# Patient Record
Sex: MALE | Race: WHITE | HISPANIC OR LATINO
[De-identification: names, ages, dates, MRNs, and addresses within clinical notes are randomized per-mention and may not be internally consistent; named-entity substitution may affect disease eponyms.]

---
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
## 2022-01-07 ENCOUNTER — APPOINTMENT (OUTPATIENT)
Dept: PEDIATRIC ENDOCRINOLOGY | Facility: CLINIC | Age: 22
End: 2022-01-07
Payer: MEDICAID

## 2022-01-07 ENCOUNTER — NON-APPOINTMENT (OUTPATIENT)
Age: 22
End: 2022-01-07

## 2022-01-07 VITALS
DIASTOLIC BLOOD PRESSURE: 85 MMHG | HEART RATE: 65 BPM | BODY MASS INDEX: 27.38 KG/M2 | WEIGHT: 195.55 LBS | SYSTOLIC BLOOD PRESSURE: 125 MMHG | HEIGHT: 71.02 IN

## 2022-01-07 LAB
CREAT SPEC-SCNC: 319 MG/DL
GAD65 AB SER-MCNC: 0 NMOL/L
IGA SER QL IEP: 227 MG/DL
INSULIN P FAST SERPL-ACNC: 11.3 UU/ML
ISLET CELL512 AB SER-SCNC: 0 NMOL/L
MICROALBUMIN 24H UR DL<=1MG/L-MCNC: 2.5 MG/DL
MICROALBUMIN/CREAT 24H UR-RTO: 8 MG/G
TTG IGA SER IA-ACNC: <1.2 U/ML
TTG IGA SER-ACNC: NEGATIVE
ZINC TRANSPORTER 8 AB: <15 U/ML

## 2022-01-07 PROCEDURE — 99211 OFF/OP EST MAY X REQ PHY/QHP: CPT

## 2022-01-07 NOTE — HISTORY OF PRESENT ILLNESS
[2] : blood sugar levels are tested 2 times per day [Arms] : arms [_____ times per night] : [unfilled] time(s) per night [_____ times per week] : mild symptoms occuring [unfilled] time(s) per week [_____ times per month] : severe symptoms occuring [unfilled] time(s) per month [Glucagon at Home] : has glucagon at home [Previous Hypoglycemic Seizure] : has no history of hypoglycemic seizure [FreeTextEntry2] : Scout is a 21y0m old male with diabetes in April 2020 (VINI, ZnT8, islet cell antibody negative), presenting for followup. He was last seen by Tammy Díaz RN on 4/3/21. A1c was 8.2% at the time. He currently manages his diabetes with insulin injections and continuous glucose monitoring- dexcom G6. \par \par Since his last visit, Scout developed covid last January and went to the ED 3 times. He has since received the covid vaccine. \par Scout states he has not received a dexcom since December 2020, and he has been checking his BG sporadically, around 2 times per day. He said he spoke to someone in [place] about it. He would like to go back on it as he found it very helpful. Scout also stated that the insulin calc leigh was removed from leigh store. Instead of using ratios manually, he guestimates his short acting insulin doses. He has symptomatic lows 1-2 times per week, sometimes at work. He treats with gatorade from the vending machine.\par \par Insulin regimen\par Basaglar 52u \par ICR 8\par ISF 18, target 130\par For high blood sugar he administers 14-15 units admelog. For large meals he will give >15, for regular sized meals he will give 11-15 units

## 2022-01-07 NOTE — PHYSICAL EXAM
[Healthy Appearing] : healthy appearing [Well Nourished] : well nourished [Interactive] : interactive [Overweight] : overweight [Normal Appearance] : normal appearance [Well formed] : well formed [Normally Set] : normally set [Normal S1 and S2] : normal S1 and S2 [Clear to Ausculation Bilaterally] : clear to auscultation bilaterally [Abdomen Soft] : soft [Abdomen Tenderness] : non-tender [] : no hepatosplenomegaly [Normal] : normal  [Acanthosis Nigricans___] : no acanthosis nigricans [Murmur] : no murmurs [de-identified] : no erythema, edema or tenderness of injection sites  [de-identified] : BELKIS EOMI b/l, optic disc sharp  [de-identified] : deferred [de-identified] : CN 2-12 grossly intact, normal gait, 2+ patellar reflexes bilaterally.

## 2022-01-07 NOTE — CONSULT LETTER
[Dear  ___] : Dear  [unfilled], [Consult Letter:] : I had the pleasure of evaluating your patient, [unfilled]. [Please see my note below.] : Please see my note below. [Consult Closing:] : Thank you very much for allowing me to participate in the care of this patient.  If you have any questions, please do not hesitate to contact me. [Sincerely,] : Sincerely, [FreeTextEntry3] : Shirlene Gonzalez MD\par [facility]\par Division of Pediatric Endocrinology

## 2022-01-07 NOTE — THERAPY
[Today's Date] : [unfilled] [___] : [unfilled] units of insulin pre-bedtime [Carbohydrate Ratio:                  1 unit for every ___ grams of carbohydrates] : Carbohydrate Ratio: 1 unit for every [unfilled] grams of carbohydrates [BG Target = ____] : BG Target = [unfilled] [Insulin Sensitivity Factor = ____] : Insulin Sensitivity Factor = [unfilled] [Other:___] : [unfilled] [FreeTextEntry5] : basaglar

## 2022-01-10 RX ORDER — BLOOD-GLUCOSE SENSOR
EACH MISCELLANEOUS
Qty: 3 | Refills: 3 | Status: ACTIVE | COMMUNITY
Start: 2020-10-21 | End: 1900-01-01

## 2022-01-10 RX ORDER — BLOOD-GLUCOSE TRANSMITTER
EACH MISCELLANEOUS
Qty: 1 | Refills: 3 | Status: ACTIVE | COMMUNITY
Start: 2020-10-21 | End: 1900-01-01

## 2022-01-20 PROBLEM — R19.8 CHANGE IN BOWEL FUNCTION: Status: ACTIVE | Noted: 2022-01-20

## 2022-01-20 NOTE — HISTORY OF PRESENT ILLNESS
[FreeTextEntry1] : 21 with chronic bloating and loose stool\par No h/o gluten intolerance. No anemia\par Previously tested for celiac\par No blood in stool. No melena. No NSAID use. No tenesmus or pain.

## 2022-01-21 DIAGNOSIS — Z01.812 ENCOUNTER FOR PREPROCEDURAL LABORATORY EXAMINATION: ICD-10-CM

## 2022-01-21 RX ORDER — POLYETHYLENE GLYCOL 3350 AND ELECTROLYTES WITH LEMON FLAVOR 236; 22.74; 6.74; 5.86; 2.97 G/4L; G/4L; G/4L; G/4L; G/4L
236 POWDER, FOR SOLUTION ORAL
Qty: 1 | Refills: 0 | Status: ACTIVE | COMMUNITY
Start: 2022-01-21 | End: 1900-01-01

## 2022-02-16 ENCOUNTER — APPOINTMENT (OUTPATIENT)
Dept: PEDIATRIC ENDOCRINOLOGY | Facility: CLINIC | Age: 22
End: 2022-02-16

## 2022-02-18 ENCOUNTER — APPOINTMENT (OUTPATIENT)
Dept: PEDIATRIC ENDOCRINOLOGY | Facility: CLINIC | Age: 22
End: 2022-02-18
Payer: MEDICAID

## 2022-02-18 VITALS
BODY MASS INDEX: 27.87 KG/M2 | WEIGHT: 201.28 LBS | HEART RATE: 120 BPM | DIASTOLIC BLOOD PRESSURE: 85 MMHG | HEIGHT: 71.38 IN | SYSTOLIC BLOOD PRESSURE: 139 MMHG

## 2022-02-18 DIAGNOSIS — E66.3 OVERWEIGHT: ICD-10-CM

## 2022-02-18 PROCEDURE — 83036 HEMOGLOBIN GLYCOSYLATED A1C: CPT | Mod: 59,QW

## 2022-02-18 PROCEDURE — 99215 OFFICE O/P EST HI 40 MIN: CPT

## 2022-02-20 LAB — HBA1C MFR BLD HPLC: 8.8

## 2022-02-21 PROBLEM — E66.3 OVERWEIGHT, PEDIATRIC: Status: ACTIVE | Noted: 2021-12-13

## 2022-02-21 NOTE — THERAPY
[Other Date: ______] : [unfilled] [Other:___] : [unfilled] [___] : [unfilled] units of insulin pre-bedtime [Carbohydrate Ratio:                  1 unit for every ___ grams of carbohydrates] : Carbohydrate Ratio: 1 unit for every [unfilled] grams of carbohydrates [BG Target = ____] : BG Target = [unfilled] [Insulin Sensitivity Factor = ____] : Insulin Sensitivity Factor = [unfilled] [Insulin on Board (IOB) Duration = ____ hours] : Insulin on Board (IOB) Duration  = [unfilled] hours [FreeTextEntry5] : basaglar
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
## 2022-02-28 ENCOUNTER — APPOINTMENT (OUTPATIENT)
Dept: GASTROENTEROLOGY | Facility: HOSPITAL | Age: 22
End: 2022-02-28

## 2022-03-07 NOTE — CONSULT LETTER
[Dear  ___] : Dear  [unfilled], [Consult Letter:] : I had the pleasure of evaluating your patient, [unfilled]. [Please see my note below.] : Please see my note below. [Consult Closing:] : Thank you very much for allowing me to participate in the care of this patient.  If you have any questions, please do not hesitate to contact me. [Sincerely,] : Sincerely, [FreeTextEntry3] : Shirlene Gonzalez MD\par [facility]\par Division of Pediatric Endocrinology  No

## 2022-03-09 ENCOUNTER — NON-APPOINTMENT (OUTPATIENT)
Age: 22
End: 2022-03-09

## 2022-04-14 ENCOUNTER — APPOINTMENT (OUTPATIENT)
Dept: PEDIATRIC MEDICAL GENETICS | Facility: CLINIC | Age: 22
End: 2022-04-14

## 2022-04-14 ENCOUNTER — APPOINTMENT (OUTPATIENT)
Dept: PEDIATRIC MEDICAL GENETICS | Facility: CLINIC | Age: 22
End: 2022-04-14
Payer: MEDICAID

## 2022-04-14 PROCEDURE — 99205 OFFICE O/P NEW HI 60 MIN: CPT

## 2022-04-14 PROCEDURE — ZZZZZ: CPT

## 2022-04-16 NOTE — FAMILY HISTORY
[FreeTextEntry1] : Scout's older sister, parents and grandparents all have type 2 diabetes.  His parents are from [place] and are nonconsanguineous.

## 2022-04-16 NOTE — PHYSICAL EXAM
[Normal shape and position] : normal shape and position [Normal] : no rash, no stigmata of neurocutaneous disease [Normal Nails] : normal nails [Normal Hair] : normal hair [Cranial Nerves] : cranial nerves are normal [Muscle tone/ strength] : muscle tone/ strength is normal [de-identified] : long fingers and toes [de-identified] : several fillings for cavities

## 2022-04-16 NOTE — HISTORY OF PRESENT ILLNESS
[FreeTextEntry1] : Scout is a 21 year old male with type 2 diabetes.  He was diagnosed in April 2020 after being hospitalized in the PICU for hyperglycemia.  Prior to his hospitalization, he had a 2 week period of fever, vomiting, extreme thirst and anxiety.   He was seen in the ER and then admitted.  Scout was started on Insulin and he has been followed in Peds Endo.  A Hem A1C in Dec 2021 was 9.9%.  In Feb 2022 it was at 8.8%.  Scout was referred to Medical Genetic to rule out BONITA.  \par \par Scout had  lesion removed from his right lower lip (cyst vs lymph malformation).  A biopsy reveled a mucocele.  He also had a neck mass removed in 2011 (not biopsied but was thought to be a cyst).     Scout had myringotomy tubes at age 6.  He has asthma and uses an inhaler as needed.

## 2022-04-16 NOTE — REASON FOR VISIT
[Initial - Scheduled] : [unfilled]  is being seen for  ~M an initial scheduled visit [FreeTextEntry3] : He is being seen to R/O BONITA.\par \par Patient was seen with genetic counselor, Yessica Berry\par

## 2022-05-11 ENCOUNTER — APPOINTMENT (OUTPATIENT)
Dept: PEDIATRIC ENDOCRINOLOGY | Facility: CLINIC | Age: 22
End: 2022-05-11

## 2022-11-10 RX ORDER — INSULIN GLARGINE 100 [IU]/ML
100 INJECTION, SOLUTION SUBCUTANEOUS
Qty: 2 | Refills: 6 | Status: ACTIVE | COMMUNITY
Start: 2020-04-26 | End: 1900-01-01

## 2022-12-07 ENCOUNTER — APPOINTMENT (OUTPATIENT)
Dept: PEDIATRIC ENDOCRINOLOGY | Facility: CLINIC | Age: 22
End: 2022-12-07

## 2022-12-07 VITALS
WEIGHT: 203.49 LBS | SYSTOLIC BLOOD PRESSURE: 130 MMHG | BODY MASS INDEX: 28.17 KG/M2 | HEIGHT: 71.1 IN | DIASTOLIC BLOOD PRESSURE: 86 MMHG | HEART RATE: 99 BPM

## 2022-12-07 DIAGNOSIS — E78.00 PURE HYPERCHOLESTEROLEMIA, UNSPECIFIED: ICD-10-CM

## 2022-12-07 DIAGNOSIS — E11.9 TYPE 2 DIABETES MELLITUS W/OUT COMPLICATIONS: ICD-10-CM

## 2022-12-07 PROCEDURE — 83036 HEMOGLOBIN GLYCOSYLATED A1C: CPT | Mod: 59,QW

## 2022-12-07 PROCEDURE — 99215 OFFICE O/P EST HI 40 MIN: CPT
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
## 2022-12-12 ENCOUNTER — NON-APPOINTMENT (OUTPATIENT)
Age: 22
End: 2022-12-12

## 2022-12-23 PROBLEM — E11.9 DIABETES MELLITUS: Status: ACTIVE | Noted: 2020-04-26

## 2022-12-23 PROBLEM — E78.00 ELEVATED LDL CHOLESTEROL LEVEL: Status: ACTIVE | Noted: 2021-12-13

## 2023-01-03 RX ORDER — INSULIN LISPRO 100 U/ML
100 INJECTION, SOLUTION SUBCUTANEOUS
Qty: 4 | Refills: 7 | Status: ACTIVE | COMMUNITY
Start: 2020-04-26 | End: 1900-01-01

## 2023-01-03 RX ORDER — PEN NEEDLE, DIABETIC 29 G X1/2"
32G X 4 MM NEEDLE, DISPOSABLE MISCELLANEOUS
Qty: 2 | Refills: 7 | Status: ACTIVE | COMMUNITY
Start: 2020-04-26 | End: 1900-01-01

## 2023-03-11 LAB
CHOLEST SERPL-MCNC: 191 MG/DL
CREAT SPEC-SCNC: 202 MG/DL
HBA1C MFR BLD HPLC: 9.9
HDLC SERPL-MCNC: 45 MG/DL
LDLC SERPL CALC-MCNC: 113 MG/DL
MICROALBUMIN 24H UR DL<=1MG/L-MCNC: 1.3 MG/DL
MICROALBUMIN/CREAT 24H UR-RTO: 6 MG/G
NONHDLC SERPL-MCNC: 146 MG/DL
T4 FREE SERPL-MCNC: 1.4 NG/DL
T4 SERPL-MCNC: 9.4 UG/DL
TRIGL SERPL-MCNC: 167 MG/DL
TSH SERPL-ACNC: 0.95 UIU/ML
TTG IGA SER IA-ACNC: <1.2 U/ML
TTG IGA SER-ACNC: NEGATIVE

## 2023-09-20 ENCOUNTER — NON-APPOINTMENT (OUTPATIENT)
Age: 23
End: 2023-09-20

## 2023-10-31 NOTE — PHYSICAL EXAM
[facility]ED[facility]  (Plasmapheresis, CPT: 80995U)      Date of Service: 10/31/2023  Requesting Physician: Vickie Rico MD  Disease Indication:  AIDP  Series Procedure Number:  A3      Patient ID: David Nieto is a 55 year old male admitted on 10/27/2023 with a primary diagnosis of acute inflammatory demyelinating polyneuropathy (AIDP).    Interim History:  Since last apheresis, yesterday, Mr. Nieto has no change in his symptoms. He has numbness and tingling in his lower extremities up to his keens and upper extremities up to his elbows. He has not been able to get out of bed as of yet so he doesn't know if his walking is any better.  He denies any headaches, shortness of breath or chest pain. He had no questions about the procedure today and tolerated it well.     Current Hospital Medications:    Current Facility-Administered Medications   Medication   • ondansetron (ZOFRAN ODT) disintegrating tablet 4 mg   • sulfamethoxazole-trimethoprim (BACTRIM SS) 400-80 MG tablet 2 tablet   • albumin human injection 175 g   • albumin human injection 12.5 g   • calcium gluconate 4,000 mg in sodium chloride 0.9 % 250 mL IVPB   • acetaminophen (TYLENOL) tablet 650 mg   • acyclovir (ZOVIRAX) tablet 400 mg   • escitalopram (LEXAPRO) tablet 10 mg   • famotidine (PEPCID) tablet 20 mg   • latanoprost (XALATAN) 0.005 % ophthalmic solution 1 drop   • loperamide (IMODIUM) capsule 2 mg   • oxyCODONE (IMM REL) (ROXICODONE) tablet 5 mg   • pregabalin (LYRICA) capsule 150 mg   • rosuvastatin (CRESTOR) tablet 5 mg   • senna (SENOKOT) 17.2 mg   • tamsulosin (FLOMAX) capsule 0.8 mg   • thiamine (VITAMIN B1) tablet 100 mg   • sodium chloride 0.9 % flush bag 25 mL   • sodium chloride 0.9 % injection 2 mL   • sodium chloride 0.9 % flush bag 25 mL   • sodium chloride (NORMAL SALINE) 0.9 % bolus 500 mL   • aluminum-magnesium hydroxide-simethicone (MAALOX) 200-200-20 MG/5ML suspension 30 mL   •  trimethobenzamide (TIGAN) injection 200 mg   • Magnesium Standard Replacement Protocol   • Potassium Standard Replacement Protocol (Levels 3.5 and lower)       Exam:  Vitals:    10/30/23 1333 10/30/23 1416 10/30/23 2026 10/31/23 0823   BP: 128/70 118/73  113/62   BP Location:  LUE - Left upper extremity  LUE - Left upper extremity   Patient Position:  Supine  Sitting   Pulse: 76 79  73   Resp: 18 18  18   Temp: 98.5 °F (36.9 °C) 97.5 °F (36.4 °C)  98.2 °F (36.8 °C)   TempSrc: Oral Oral  Oral   SpO2: 95% 97%  98%   Weight:    94.1 kg (207 lb 7.3 oz)   Height:   5' 5\" (1.651 m)        Mr. Nieto is resting well in his bed, in no acute distress.     Labs:    Lab Results   Component Value Date/Time    HGB 12.6 (L) 10/27/2023 07:18 PM    HGB 12.5 (L) 12/12/2019 11:20 AM    HCT 38.8 (L) 10/27/2023 07:18 PM    HCT 35.7 (L) 12/12/2019 11:20 AM    WBC 4.5 10/27/2023 07:18 PM    WBC 5.8 12/12/2019 11:20 AM     10/27/2023 07:18 PM     (L) 12/12/2019 11:20 AM     04/13/2013 06:58 AM       After reviewing all relevant clinical and laboratory data, I believe it is both safe and medically necessary to perform today's plasmapheresis.    Procedure:    A 1 plasma volume exchange transfusion was completed. I saw Mr. Nieto during the procedure and I was immediately available during the entire course of the procedure.    Type of fluid used for exchange: 5% albumin    Calcium replacement was achieved with: calcium gluconate (10%), 4 grams    The patient required the following medications during the procedure: No PRN medications needed.     Complications: no complications    Plan: Today's procedure was the third procedure of 5 total planned procedures. Next procedure is due on 11/2/2023 and 11/3/2023.     Mckenna Waite MD  Transfusion Medicine Fellow    [Healthy Appearing] : healthy appearing [Interactive] : interactive [Well Nourished] : well nourished [Overweight] : overweight [Acanthosis Nigricans___] : no acanthosis nigricans [Normal Appearance] : normal appearance [Well formed] : well formed [Normally Set] : normally set [Normal S1 and S2] : normal S1 and S2 [Murmur] : no murmurs [Clear to Ausculation Bilaterally] : clear to auscultation bilaterally [Abdomen Soft] : soft [Abdomen Tenderness] : non-tender [] : no hepatosplenomegaly [Normal] : normal  [de-identified] : no erythema, edema or tenderness of injection sites . no lipoatrophy or lipohypertrophy [de-identified] : BELKIS EOMI b/l, optic disc sharp  [de-identified] : deferred [de-identified] : CN 2-12 grossly intact, normal gait, 2+ patellar reflexes bilaterally.

## 2023-11-22 ENCOUNTER — EMERGENCY (EMERGENCY)
Facility: HOSPITAL | Age: 23
LOS: 1 days | Discharge: HOME | End: 2023-11-22
Attending: STUDENT IN AN ORGANIZED HEALTH CARE EDUCATION/TRAINING PROGRAM
Payer: COMMERCIAL

## 2023-11-22 VITALS
SYSTOLIC BLOOD PRESSURE: 132 MMHG | HEART RATE: 96 BPM | RESPIRATION RATE: 18 BRPM | OXYGEN SATURATION: 96 % | DIASTOLIC BLOOD PRESSURE: 90 MMHG | TEMPERATURE: 99 F

## 2023-11-22 VITALS
RESPIRATION RATE: 18 BRPM | TEMPERATURE: 98 F | HEART RATE: 130 BPM | OXYGEN SATURATION: 95 % | DIASTOLIC BLOOD PRESSURE: 84 MMHG | SYSTOLIC BLOOD PRESSURE: 159 MMHG

## 2023-11-22 PROCEDURE — 93010 ELECTROCARDIOGRAM REPORT: CPT

## 2023-11-22 PROCEDURE — 99285 EMERGENCY DEPT VISIT HI MDM: CPT

## 2023-11-22 PROCEDURE — 71046 X-RAY EXAM CHEST 2 VIEWS: CPT | Mod: 26

## 2023-11-22 RX ORDER — ACETAMINOPHEN 500 MG
975 TABLET ORAL ONCE
Refills: 0 | Status: COMPLETED | OUTPATIENT
Start: 2023-11-22 | End: 2023-11-22

## 2023-11-22 RX ORDER — ONDANSETRON 8 MG/1
4 TABLET, FILM COATED ORAL ONCE
Refills: 0 | Status: COMPLETED | OUTPATIENT
Start: 2023-11-22 | End: 2023-11-22

## 2023-11-22 RX ADMIN — ONDANSETRON 4 MILLIGRAM(S): 8 TABLET, FILM COATED ORAL at 21:34

## 2023-11-22 RX ADMIN — Medication 975 MILLIGRAM(S): at 21:34

## 2023-11-22 NOTE — ED PROVIDER NOTE - NSFOLLOWUPINSTRUCTIONS_ED_ALL_ED_FT
Nasal Fracture    WHAT YOU NEED TO KNOW:    What is a nasal fracture? A nasal fracture is a crack or break in your nose. You may have a break in the upper nose (bridge), the side, or the septum. The septum is in the middle of the nose and divides your nostrils.    What are the signs and symptoms of a nasal fracture?    Pain and swelling    Nosebleed    Deformed nose    Crackling sound when you touch or move your nose    Bruising on your nose or under your eyes  How is a nasal fracture diagnosed? Your healthcare provider will ask you when, where, and how the injury occurred. You may need any of the following:    A nasal exam will be done to check your injury. You will be given pain medicine before your healthcare provider touches and looks at the outside and inside of your nose. Your provider will remove blood clots and check for hematomas (collections of blood).    An x-ray or CT may show the nasal fracture. You may be given contrast liquid before the scan. Tell the healthcare provider if you have ever had an allergic reaction to contrast liquid.  How is a nasal fracture treated?    Medicine may be given to decrease pain or help prevent a bacterial infection. Ask how to take pain medicine safely. Medicine may also be given to decrease nasal swelling and help make breathing easier.    Wound care may help stop bleeding. If you have a hematoma inside your nose, it will be drained. Healthcare providers may place packing (gauze or other material) inside your nose to soak up blood.    Closed reduction may be done to put your nasal bones back into the correct position. Local or general anesthesia is used during this procedure. This procedure may be done right away or several days after your injury when the swelling has gone down. Surgery (open reduction) to put your bones back into place may be needed for severe fractures.    Splints or packing help keep your nose in place for 7 to 10 days after a reduction. Ask your healthcare provider how to care for your wounds, splint, or packing.  How do I care for my nasal fracture at home?    Apply ice on your nose for 15 to 20 minutes every hour or as directed. Use an ice pack, or put crushed ice in a plastic bag. Cover it with a towel. Ice helps prevent tissue damage and decreases swelling and pain.    Elevate your head when you lie down. This will help decrease swelling and pain. You may need to see a specialist 3 to 5 days later for tests or more treatment after swelling has gone down.    Protect your nose to prevent bleeding, bruising, or another fracture. Try not to bump your nose on anything. You may not be able to play sports for up to 6 weeks.  When should I seek immediate care?    You feel like one or both of your nasal passages are blocked and you have trouble breathing.    Clear fluid is leaking from your nose.    You have severe nose pain, even after you take medicine.    You have double vision or have problems moving your eyes.  When should I call my doctor?    You have a fever.    You continue to have nosebleeds.    You have a headache that gets worse, even after you take pain medicine.    Your splint or packing is loose.    You have questions or concerns about your condition or care.    ======================    Concussion, Adult  Three rear views of the head showing how quick, sudden head movements injure the brain.  A concussion is a brain injury from a hard, direct hit (trauma) to the head or body. This direct hit causes the brain to shake quickly back and forth inside the skull. This can damage brain cells and cause chemical changes in the brain. A concussion may also be known as a mild traumatic brain injury (TBI).    The effects of a concussion can be serious. If you have a concussion, you should be very careful to avoid having a second concussion.    What are the causes?  This condition is caused by:  A direct hit to your head.  Sudden movement of your body that causes your brain to move back and forth inside the skull, such as in a car crash.  What are the signs or symptoms?  The signs of a concussion can be hard to notice. Early on, they may be missed by you, family members, and health care providers. You may look fine on the outside but may act or feel differently.    Every head injury is different. Symptoms are usually temporary but may last for days, weeks, or even months. Some symptoms appear right away, but other symptoms may not show up for hours or days.    Physical symptoms    Headaches.  Dizziness and problems with coordination or balance.  Sensitivity to light or noise.  Nausea or vomiting.  Tiredness (fatigue).  Vision or hearing problems.  Seizure.  Mental and emotional symptoms    Irritability or mood changes.  Memory problems.  Trouble concentrating, organizing, or making decisions.  Changes in eating or sleeping patterns.  Slowness in thinking, acting or reacting, speaking, or reading.  Anxiety or depression.  How is this diagnosed?  This condition is diagnosed based on your symptoms and injury.    You may also have tests, including:  Imaging tests, such as a CT scan or an MRI.  Neuropsychological tests. These measure your thinking, understanding, learning, and memory.  How is this treated?  Treatment for this condition includes:  Stopping sports or activity if you are injured.  Physical and mental rest and careful observation, usually at home.  Medicines to help with symptoms such as headaches, nausea, or difficulty sleeping.  Referral to a concussion clinic or rehab center.  Follow these instructions at home:  Activity    Limit activities that require a lot of thought or concentration, such as:  Doing homework or job-related work.  Watching TV.  Using the computer or phone.  Playing memory games and doing puzzles.  Rest helps your brain heal. Make sure you:  Get plenty of sleep. Most adults should get 7–9 hours of sleep each night.  Rest during the day. Take naps or rest breaks when you feel tired.  Avoid high-intensity exercise or physical activities that take a lot of effort. Stop any activity that worsens symptoms. Your health care provider may recommend light exercise such as walking.  Do not do high-risk activities that could cause a second concussion, such as riding a bike or playing sports.  Ask your health care provider when you can return to your normal activities, such as school, work, sports, and driving. Your ability to react may be slower after a brain injury. Never do these activities if you are dizzy.  General instructions    A bottle of beer, a glass of wine, and a glass of hard liquor with a "do not drink" sign over them.   Take over-the-counter and prescription medicines only as told by your health care provider. Some medicines, such as blood thinners (anticoagulants) and aspirin, may increase the risk for complications, such as bleeding.  Avoid taking opioid pain medicine while recovering from a concussion.  Do not drink alcohol until your health care provider says you can. Drinking alcohol may slow your recovery and can put you at risk of further injury.  Watch your symptoms and tell others around you to do the same. Complications sometimes occur after a concussion.  Tell your , teachers, school nurse, school counselor, , or  about your injury, symptoms, and restrictions.  See a mental health therapist if you feel anxious or depressed. Managing this condition can be challenging.  Keep all follow-up visits. Your health care provider will check on your recovery and give you a plan for returning to activities.  How is this prevented?  Avoiding another brain injury is very important. In rare cases, another injury can lead to permanent brain damage, brain swelling, or death. The risk of this is greatest during the first 7–10 days after a head injury. Avoid injuries by:  Stopping activities that could lead to a second concussion, such as contact or recreational sports, until your health care provider says it is okay.  Taking these actions once you have returned to sports or activities:  Avoid plays or moves that can cause you to crash into another person. This is how most concussions occur.  Follow the rules and be respectful of other players. Do not engage in violent or illegal plays.  Getting regular exercise that includes strength and balance training.  Wearing a properly fitting helmet during sports, biking, or other activities. Helmets can help protect you from serious skull and brain injuries, but they may not protect you from a concussion. Even when wearing a helmet, you should avoid being hit in the head.  Where to find more information  Centers for Disease Control and Prevention: cdc.gov  Contact a health care provider if:  Your symptoms do not improve or get worse.  You have new symptoms.  You have another injury.  Your coordination gets worse.  You have unusual behavior changes.  Get help right away if:  You have a severe or worsening headache.  You have weakness or numbness in any part of your body, slurred speech, vision changes, or confusion.  You vomit repeatedly.  You lose consciousness, are sleepier than normal, or are difficult to wake up.  You have a seizure.  These symptoms may be an emergency. Get help right away. Call 911.  Do not wait to see if the symptoms will go away.  Do not drive yourself to the hospital.  Also, get help right away if:  You have thoughts of hurting yourself or others.  Take one of these steps if you feel like you may hurt yourself or others, or have thoughts about taking your own life:  Go to your nearest emergency room.  Call 911.  Call the National Suicide Prevention Lifeline at 1-182.478.3824 or 692. This is open 24 hours a day.  Text the Crisis Text Line at 942843.  This information is not intended to replace advice given to you by your health care provider. Make sure you discuss any questions you have with your health care provider.
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
## 2023-11-22 NOTE — ED ADULT NURSE NOTE - NSICDXPASTMEDICALHX_GEN_ALL_CORE_FT
PAST MEDICAL HISTORY:  Acute Non Suppurative Otitis Media     Febrile Seizure     S/P Myringotomy with Insertion of Tube     Type 1 diabetes mellitus

## 2023-11-22 NOTE — ED PROVIDER NOTE - CARE PROVIDER_API CALL
Cipriano Sheltno  Otolaryngology  [address] [place] [address], [address]  [place] [address]  Phone: [phone]  Fax: [phone]  Follow Up Time:

## 2023-11-22 NOTE — ED PROVIDER NOTE - PHYSICAL EXAMINATION
Constitutional: Awake, alert, in no acute distress  Eyes: PERRL  HENT: no scalp tenderness or deformity, +superficial abrasion overlying right nose, +dried blood in nares, no septal hematoma, airway patent  Neck: +diffuse cervical tenderness, no palpable stepoff, no tracheal deviation  CV: +diffuse anterior chest wall tenderness, no crepitus or subcutaneous emphysema.  RRR, no murmur  Pulm: non-labored respirations, CTAB  Abdomen: soft, non-tender, non-distended, no ecchymosis  Back: no spinal tenderness, no palpable stepoff  Extremities: stable pelvis, no extremity tenderness or deformity  Skin: no rash  Neuro: AAOx3, GCS 15, moving all extremities equally, no focal neurologic deficit

## 2023-11-22 NOTE — ED PROVIDER NOTE - PATIENT PORTAL LINK FT
You can access the FollowMyHealth Patient Portal offered by [facility] by registering at the following website: [email or web address]. By joining [employer]’s FollowMyHealth portal, you will also be able to view your health information using other applications (apps) compatible with our system.
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
## 2023-11-23 PROCEDURE — 71046 X-RAY EXAM CHEST 2 VIEWS: CPT

## 2023-11-23 PROCEDURE — 70486 CT MAXILLOFACIAL W/O DYE: CPT | Mod: ME

## 2023-11-23 PROCEDURE — 70450 CT HEAD/BRAIN W/O DYE: CPT | Mod: 26,MG

## 2023-11-23 PROCEDURE — 72125 CT NECK SPINE W/O DYE: CPT | Mod: 26,MG

## 2023-11-23 PROCEDURE — 72125 CT NECK SPINE W/O DYE: CPT | Mod: MG

## 2023-11-23 PROCEDURE — G1004: CPT

## 2023-11-23 PROCEDURE — 93005 ELECTROCARDIOGRAM TRACING: CPT

## 2023-11-23 PROCEDURE — 70486 CT MAXILLOFACIAL W/O DYE: CPT | Mod: 26,ME

## 2023-11-23 PROCEDURE — 70450 CT HEAD/BRAIN W/O DYE: CPT | Mod: MG

## 2023-11-23 PROCEDURE — 99284 EMERGENCY DEPT VISIT MOD MDM: CPT | Mod: 25

## 2023-11-23 RX ORDER — IBUPROFEN 200 MG
400 TABLET ORAL ONCE
Refills: 0 | Status: COMPLETED | OUTPATIENT
Start: 2023-11-23 | End: 2023-11-23

## 2023-11-23 RX ADMIN — Medication 400 MILLIGRAM(S): at 01:16

## 2024-09-26 ENCOUNTER — EMERGENCY (EMERGENCY)
Facility: HOSPITAL | Age: 24
LOS: 1 days | Discharge: HOME | End: 2024-09-26
Attending: EMERGENCY MEDICINE
Payer: COMMERCIAL

## 2024-09-26 VITALS
RESPIRATION RATE: 18 BRPM | TEMPERATURE: 97 F | OXYGEN SATURATION: 96 % | SYSTOLIC BLOOD PRESSURE: 124 MMHG | DIASTOLIC BLOOD PRESSURE: 82 MMHG | HEART RATE: 62 BPM

## 2024-09-26 VITALS
WEIGHT: 212.97 LBS | DIASTOLIC BLOOD PRESSURE: 76 MMHG | OXYGEN SATURATION: 99 % | HEIGHT: 72 IN | TEMPERATURE: 98 F | HEART RATE: 57 BPM | SYSTOLIC BLOOD PRESSURE: 122 MMHG | RESPIRATION RATE: 16 BRPM

## 2024-09-26 PROBLEM — E10.9 TYPE 1 DIABETES MELLITUS WITHOUT COMPLICATIONS: Chronic | Status: ACTIVE | Noted: 2023-11-22

## 2024-09-26 LAB
ACETONE SERPL-MCNC: NEGATIVE — SIGNIFICANT CHANGE UP
ALBUMIN SERPL ELPH-MCNC: 4.8 G/DL — SIGNIFICANT CHANGE UP (ref 3.3–5.2)
ALP SERPL-CCNC: 105 U/L — SIGNIFICANT CHANGE UP (ref 40–120)
ALT FLD-CCNC: 21 U/L — SIGNIFICANT CHANGE UP
ANION GAP SERPL CALC-SCNC: 12 MMOL/L — SIGNIFICANT CHANGE UP (ref 5–17)
AST SERPL-CCNC: 20 U/L — SIGNIFICANT CHANGE UP
BASOPHILS # BLD AUTO: 0.04 K/UL — SIGNIFICANT CHANGE UP (ref 0–0.2)
BASOPHILS NFR BLD AUTO: 0.4 % — SIGNIFICANT CHANGE UP (ref 0–2)
BILIRUB SERPL-MCNC: 1 MG/DL — SIGNIFICANT CHANGE UP (ref 0.4–2)
BUN SERPL-MCNC: 10.7 MG/DL — SIGNIFICANT CHANGE UP (ref 8–20)
CALCIUM SERPL-MCNC: 8.9 MG/DL — SIGNIFICANT CHANGE UP (ref 8.4–10.5)
CHLORIDE SERPL-SCNC: 103 MMOL/L — SIGNIFICANT CHANGE UP (ref 96–108)
CO2 SERPL-SCNC: 26 MMOL/L — SIGNIFICANT CHANGE UP (ref 22–29)
CREAT SERPL-MCNC: 0.69 MG/DL — SIGNIFICANT CHANGE UP (ref 0.5–1.3)
EGFR: 135 ML/MIN/1.73M2 — SIGNIFICANT CHANGE UP
EOSINOPHIL # BLD AUTO: 0.04 K/UL — SIGNIFICANT CHANGE UP (ref 0–0.5)
EOSINOPHIL NFR BLD AUTO: 0.4 % — SIGNIFICANT CHANGE UP (ref 0–6)
GAS PNL BLDV: SIGNIFICANT CHANGE UP
GLUCOSE SERPL-MCNC: 147 MG/DL — HIGH (ref 70–99)
HCT VFR BLD CALC: 46.9 % — SIGNIFICANT CHANGE UP (ref 39–50)
HGB BLD-MCNC: 16.2 G/DL — SIGNIFICANT CHANGE UP (ref 13–17)
IMM GRANULOCYTES NFR BLD AUTO: 0.3 % — SIGNIFICANT CHANGE UP (ref 0–0.9)
LIDOCAIN IGE QN: 17 U/L — LOW (ref 22–51)
LYMPHOCYTES # BLD AUTO: 1.24 K/UL — SIGNIFICANT CHANGE UP (ref 1–3.3)
LYMPHOCYTES # BLD AUTO: 12.3 % — LOW (ref 13–44)
MCHC RBC-ENTMCNC: 29.1 PG — SIGNIFICANT CHANGE UP (ref 27–34)
MCHC RBC-ENTMCNC: 34.5 GM/DL — SIGNIFICANT CHANGE UP (ref 32–36)
MCV RBC AUTO: 84.4 FL — SIGNIFICANT CHANGE UP (ref 80–100)
MONOCYTES # BLD AUTO: 0.47 K/UL — SIGNIFICANT CHANGE UP (ref 0–0.9)
MONOCYTES NFR BLD AUTO: 4.6 % — SIGNIFICANT CHANGE UP (ref 2–14)
NEUTROPHILS # BLD AUTO: 8.29 K/UL — HIGH (ref 1.8–7.4)
NEUTROPHILS NFR BLD AUTO: 82 % — HIGH (ref 43–77)
PLATELET # BLD AUTO: 314 K/UL — SIGNIFICANT CHANGE UP (ref 150–400)
POTASSIUM SERPL-MCNC: 4.1 MMOL/L — SIGNIFICANT CHANGE UP (ref 3.5–5.3)
POTASSIUM SERPL-SCNC: 4.1 MMOL/L — SIGNIFICANT CHANGE UP (ref 3.5–5.3)
PROT SERPL-MCNC: 7.7 G/DL — SIGNIFICANT CHANGE UP (ref 6.6–8.7)
RBC # BLD: 5.56 M/UL — SIGNIFICANT CHANGE UP (ref 4.2–5.8)
RBC # FLD: 12.3 % — SIGNIFICANT CHANGE UP (ref 10.3–14.5)
SODIUM SERPL-SCNC: 141 MMOL/L — SIGNIFICANT CHANGE UP (ref 135–145)
WBC # BLD: 10.11 K/UL — SIGNIFICANT CHANGE UP (ref 3.8–10.5)
WBC # FLD AUTO: 10.11 K/UL — SIGNIFICANT CHANGE UP (ref 3.8–10.5)

## 2024-09-26 PROCEDURE — 82947 ASSAY GLUCOSE BLOOD QUANT: CPT

## 2024-09-26 PROCEDURE — 80053 COMPREHEN METABOLIC PANEL: CPT

## 2024-09-26 PROCEDURE — 99284 EMERGENCY DEPT VISIT MOD MDM: CPT | Mod: 25

## 2024-09-26 PROCEDURE — 96374 THER/PROPH/DIAG INJ IV PUSH: CPT

## 2024-09-26 PROCEDURE — 99284 EMERGENCY DEPT VISIT MOD MDM: CPT

## 2024-09-26 PROCEDURE — 84295 ASSAY OF SERUM SODIUM: CPT

## 2024-09-26 PROCEDURE — 84132 ASSAY OF SERUM POTASSIUM: CPT

## 2024-09-26 PROCEDURE — 82435 ASSAY OF BLOOD CHLORIDE: CPT

## 2024-09-26 PROCEDURE — 85025 COMPLETE CBC W/AUTO DIFF WBC: CPT

## 2024-09-26 PROCEDURE — 82803 BLOOD GASES ANY COMBINATION: CPT

## 2024-09-26 PROCEDURE — 83690 ASSAY OF LIPASE: CPT

## 2024-09-26 PROCEDURE — 36415 COLL VENOUS BLD VENIPUNCTURE: CPT

## 2024-09-26 PROCEDURE — 82330 ASSAY OF CALCIUM: CPT

## 2024-09-26 PROCEDURE — 76705 ECHO EXAM OF ABDOMEN: CPT | Mod: 26

## 2024-09-26 PROCEDURE — 85014 HEMATOCRIT: CPT

## 2024-09-26 PROCEDURE — 82009 KETONE BODYS QUAL: CPT

## 2024-09-26 PROCEDURE — 83605 ASSAY OF LACTIC ACID: CPT

## 2024-09-26 PROCEDURE — 76705 ECHO EXAM OF ABDOMEN: CPT

## 2024-09-26 PROCEDURE — 85018 HEMOGLOBIN: CPT

## 2024-09-26 RX ORDER — ONDANSETRON 2 MG/ML
1 INJECTION, SOLUTION INTRAMUSCULAR; INTRAVENOUS
Qty: 12 | Refills: 0
Start: 2024-09-26 | End: 2024-09-28

## 2024-09-26 RX ORDER — ONDANSETRON 2 MG/ML
4 INJECTION, SOLUTION INTRAMUSCULAR; INTRAVENOUS ONCE
Refills: 0 | Status: COMPLETED | OUTPATIENT
Start: 2024-09-26 | End: 2024-09-26

## 2024-09-26 RX ADMIN — ONDANSETRON 4 MILLIGRAM(S): 2 INJECTION, SOLUTION INTRAMUSCULAR; INTRAVENOUS at 12:36

## 2024-09-26 RX ADMIN — Medication 1000 MILLILITER(S): at 12:35

## 2024-09-26 NOTE — ED PROVIDER NOTE - ADDITIONAL NOTES AND INSTRUCTIONS:
PT was evaluated At [facility] ED and was found to have a condition that warranted time of to rest and heal from WORK/SCHOOL.   Senthil Chaparro PA-C

## 2024-09-26 NOTE — ED ADULT TRIAGE NOTE - ARRIVAL FROM
Colon polyp  benign 2003  Depression    Diabetes mellitus  type 2  Elevated uric acid in blood    HTN (hypertension)    Hyperlipidemia    Hypothyroidism    Knee pain  right knee pain and deformity  since age 22  Obesity (BMI 30-39.9)    Osteoarthritis    Shoulder fracture, right  2006  Sleep apnea  uses CPAP machine ; had sleep studies in 2003 and 2011
Home

## 2024-09-26 NOTE — ED ADULT NURSE NOTE - OBJECTIVE STATEMENT
24 YO male sent to the ED from Urgent care with c/o RUQ abdominal pain associated with nausea and multiple episodes of non-bilious vomiting since 0500 this morning. Patient is a known type I diabetic on insulin. RUQ abdominal pain, intermittent, throbbing non-radiating. Patient stated with every episode of vomiting he feels diaphoretic and lightheaded. fasting blood sugar at Urgent care was 171. Patient unable to tolerate anything PO since morning. Denies taking insulin this morning. Denies diarrhea, chest pain, SOB, headache, vision changes, fever, chills or urinary symptoms. blood work sent, awaiting CT scan.

## 2024-09-26 NOTE — ED PROVIDER NOTE - OBJECTIVE STATEMENT
23-year-old male with past ministry of type 1 diabetes presents with vomiting.  Patient ports he had 1 day of multiple episodes of nonbloody, nonbilious emesis.  He reports he is vomiting urgent comes up.  He denies any abdominal pain however, and no fever, chills, diarrhea, dysuria.  Patient reports that his sugars been well-controlled, usually in the 90s to low 100s.  Patient went to urgent care, his fasting blood sugar was 170 he was advised to come here for further evaluation 23-year-old male with past medical hx of type 1 diabetes presents with vomiting.  Patient ports he had 1 day of multiple episodes of nonbloody, nonbilious emesis.  .  He denies any abdominal pain however, and no fever, chills, diarrhea, dysuria.  Patient reports that his sugars been well-controlled, usually in the 90s to low 100s.  Patient went to urgent care, his fasting blood sugar was 170 he was advised to come here for further evaluation

## 2024-09-26 NOTE — ED PROVIDER NOTE - ATTENDING APP SHARED VISIT CONTRIBUTION OF CARE
I, Wally Zayas, performed the initial face to face bedside interview with this patient regarding history of present illness, review of symptoms and relevant past medical, social and family history.  I completed an independent physical examination.  I was the initial provider who evaluated this patient. I have signed out the follow up of any pending tests (i.e. labs, radiological studies) to the ACP.  I have communicated the patient’s plan of care and disposition with the ACP.

## 2024-09-26 NOTE — ED PROVIDER NOTE - NSFOLLOWUPINSTRUCTIONS_ED_ALL_ED_FT
Patient education: Nausea and vomiting in adults (The Basics)  Written by the doctors and editors at [facility]  Please read the Disclaimer at the end of this page.    What are nausea and vomiting?    Nausea is the feeling that you might throw up. Vomiting is when you actually throw up. They can happen together. But you can feel nauseous without vomiting, and you can vomit without feeling nauseous first.    What causes nausea and vomiting?    The most common causes include:    ?Food poisoning – This can happen if you eat food that has gone bad. It is basically an infection in your stomach. Food poisoning often also causes diarrhea.    ?Other infections – Other kinds of infections that affect the stomach or intestines can also cause nausea and vomiting. These include infections caused by different viruses and bacteria.    ?Dizziness or motion sickness – These can happen if you're on a boat, in a car, or in something else that moves. It can also happen if there's something wrong inside your ears that affects your balance.    ?Medicines – Lots of different medicines can cause nausea or vomiting. Some examples are antidepressants, antibiotics, vitamins, birth control pills, and pain medicines. People getting chemotherapy for cancer treatment or those who have been under anesthesia also often have nausea or vomiting. Sometimes, people who use cannabis (marijuana) over a long time have repeated episodes of vomiting.    ?Pregnancy – Many pregnant people have nausea or vomiting. This is sometimes called "morning sickness," but it can happen at any time of day.    ?Gastroesophageal reflux disease ("GERD") – This is a condition that causes the juices that are in the stomach to leak back up into the esophagus, the tube that connects the throat to the stomach. It can sometimes cause nausea.    ?Problems with the stomach or intestines – In some people, the stomach or intestines do not move food along correctly. In others, the intestines can get blocked. Both of these can cause nausea or vomiting.    ?Migraine – This condition involves a headache and often includes other symptoms, such as nausea and vomiting.    ?Alcohol – Drinking too much alcohol can cause nausea and vomiting.    Uncommon, but serious, causes of nausea and vomiting include:    ?Heart attack – This can sometimes cause nausea and/or vomiting. People who are having a heart attack usually also have chest pain.    ?Stroke – Certain types can cause vomiting. People who are having a stroke usually also have other symptoms, such as dizziness, balance problems, vision changes, or trouble speaking.    What can I do on my own to feel better?    You can:    ?Drink fluids when you can. It might help to take small sips every 15 to 30 minutes. Try to drink more as you start to feel better.    When you are vomiting a lot, your body loses both water and salt. Drinking fluids that contain salt can help replace what your body lost. Examples include "oral rehydration solutions," sports drinks, and broth.    ?Try eating, but start with foods with a lot of fluid in them. Good examples are soup, gelatin, and ice pops. If this goes well, you can try soft, bland foods. Foods that are high in carbohydrates ("carbs"), like bread or saltine crackers, can help settle your stomach. Ale can also help with nausea.    Avoid foods with a lot of fat in them. They can make nausea worse. Call your doctor if your symptoms come back when you try to eat.    ?Avoid strong smells, such as perfume.    ?If you need to take medicines, take them with meals if possible. But check the bottle first, because some medicines must be taken on an empty stomach.    How are nausea and vomiting treated?    If you have been vomiting a lot for more than a day, you might need other treatment or tests. Your doctor or nurse will ask you questions to try to find out what might be causing your symptoms. They might also:    ?Give you fluids through a thin tube that goes in a vein, called an "IV"    ?Give you medicines to help control nausea and vomiting. Examples include:    •Prochlorperazine (brand name: Compro)    •Promethazine (brand name: Phenergan)    •Metoclopramide (brand name: Reglan)    •Ondansetron (brand name: Zofran)    ?Schedule tests for you to help find out why you have nausea or vomiting, such as:    •Stomach X-ray    •Upper endoscopy – A doctor puts a thin tube down your throat and into your stomach. The tube has a light and a tiny camera on the end, so the doctor can see inside your stomach.    Can nausea and vomiting be prevented?    It depends on the cause. There are things you can do to lower the risk of getting an infection that causes nausea and vomiting:    ?Wash your hands often with soap and water (figure 1). This is especially important if you have been around someone who is sick. It's also important to wash your hands after using the bathroom or changing diapers.    ?Pay attention to food safety. This includes avoiding unpasteurized milk, washing fruits and vegetables before eating them, not eating undercooked food, and washing all cooking utensils. Wash your hands thoroughly after touching raw food.    If you often get nausea and vomiting for another reason, like motion sickness or migraine, talk to your doctor or nurse. They might have ways to help.    When should I call the doctor?    Call your doctor or nurse if you:    ?Have symptoms that last longer than a day or 2, or are severe    ?Have chest or belly pain    ?Vomit blood or something that looks like coffee grounds    ?Have a bowel movement with blood, or a bowel movement that is black and looks like tar    ?Have a fever higher than 101°F (38.4°C)    ?Have a severe headache or stiff neck    ?Feel very tired or have trouble getting up    ?Show signs of dehydration (which is when your body has lost too much water), such as:    •Feeling very tired    •Being very thirsty, or having a dry mouth or tongue    •Muscle cramps    •Dizziness    •Confusion    •Having dark yellow urine, or not needing to urinate for more than 5 hours

## 2024-09-26 NOTE — ED ADULT TRIAGE NOTE - TEMP AT ED ARRIVAL (C)
72 year old woman with HTN, DM2, past surgical history of  x 2, hysterectomy, and laparoscopic cholecystectomy (), admitted for large bowel obstruction; had spontaneous return of bowel function initially; however, bowel obstruction recurred, so taken to OR for ex lap with lysis of adhesions and bowel resection () . Monitored in SICU post -op ; now on regional. Hospital course complicated by up-trending leukocytosis of unknown etiology.     # Large bowel obstruction   #post operative state  s/p OR for ex lap with lysis of adhesions and bowel resection ()  - rest of care per primary team     # Leukocytosis -wbc down trending- to normal 9k today   unclear etiology -- Had CT imaging done that showed : Small fluid collections in the pelvis are only partially walled off. They could represent small hematomas or early developing abscesses  Was started on meropenem by ID   ESR/CRP eleavted   midline - needs to be removed at discharge as abx are being discontinued on d/c      #reactive thrombocytosis  plts elevated from 672 --> 719k ->615-> 633  will continue to monitor     #hypophosphatemia   phos 2.4  recommend replacement     #HTN   takes HCTZ 25mg qd, losartan 100mg qd, labetalol 200mg BID, nifedipine 60mg ER qd at home.   - continue labetalol 200mg BID  - on losartan at 50mg qd   - on nifepidine 60m ER qd ( can hold if bp is low )   - Continue to hold HCTZ on discharge as pt's BP is well controlled on above 3 anti-htn medication     # Type 2 Diabetes complicated by hyperglycemia   Takes metformin at home.   cw insulin sliding scale while inpatient.   resume metformin on discharge.     #Incidental finding of bilateral lung nodules  CT chest showing bilateral lung nodules, measuring up to 7 mm in the right   upper lobe  Recommend follow-up chest CT in three months to ensure stability.    # COVID 19 infection (present on admission)   Incidental finding of COVID 19 infection on admission nasal swab. Without any dyspnea, or sore throat. Per [facility] treatment algorithm, did not meet criteria for monoclonal antibodies at time of admission.   No O2 requirement, does not meet criteria for remdesivir/dexamethasone.   Recommend enoxaparin for DVT ppx     # Acute blood loss anemia   Hgb downtrended from time of admission to-->8-> 7.5  Partially attributable to operative blood losses and Right sided extraperitoneal hematoma  continue to trend daily CBC while inpatient;  ferritin, iron sat not low  age appropriate cancer screening outpatient     DVT ppx - HSQ 72 year old woman with HTN, DM2, past surgical history of  x 2, hysterectomy, and laparoscopic cholecystectomy (), admitted for large bowel obstruction; had spontaneous return of bowel function initially; however, bowel obstruction recurred, so taken to OR for ex lap with lysis of adhesions and bowel resection () . Monitored in SICU post -op ; now on regional. Hospital course complicated by up-trending leukocytosis of unknown etiology.     # Large bowel obstruction   #post operative state  s/p OR for ex lap with lysis of adhesions and bowel resection ()  - rest of care per primary team     # Leukocytosis -wbc down trending- to normal 9k today   unclear etiology -- Had CT imaging done that showed : Small fluid collections in the pelvis are only partially walled off. They could represent small hematomas or early developing abscesses  Was started on meropenem by ID   ESR/CRP eleavted   midline - needs to be removed at discharge as abx are being discontinued on d/c      #reactive thrombocytosis  plts elevated from 672 --> 719k ->615-> 633  will continue to monitor     #hypophosphatemia   phos 2.4  recommend replacement     #HTN   takes HCTZ 25mg qd, losartan 100mg qd, labetalol 200mg BID, nifedipine 60mg ER qd at home.   - continue labetalol 200mg BID  - on losartan at 50mg qd   - on nifepidine 60m ER qd ( can hold if bp is low )   - Continue to hold HCTZ on discharge as pt's BP is well controlled on above 3 anti-htn medication     # Type 2 Diabetes complicated by hyperglycemia   Takes metformin at home.   cw insulin sliding scale while inpatient.   pt's a1c at goal for her age, resume metformin on discharge.     #Incidental finding of bilateral lung nodules  CT chest showing bilateral lung nodules, measuring up to 7 mm in the right   upper lobe  Recommend follow-up chest CT in three months to ensure stability.    # COVID 19 infection (present on admission)   Incidental finding of COVID 19 infection on admission nasal swab. Without any dyspnea, or sore throat. Per [facility] treatment algorithm, did not meet criteria for monoclonal antibodies at time of admission.   No O2 requirement, does not meet criteria for remdesivir/dexamethasone.   Recommend enoxaparin for DVT ppx     # Acute blood loss anemia   Hgb downtrended from time of admission to-->8-> 7.5  Partially attributable to operative blood losses and Right sided extraperitoneal hematoma  continue to trend daily CBC while inpatient;  ferritin, iron sat not low  age appropriate cancer screening outpatient     DVT ppx - HSQ 36.8

## 2024-09-26 NOTE — ED PROVIDER NOTE - CROS ED CARDIOVAS ALL NEG
Chronic and ongoing. She states that she has been trying to work on her diet and is wanting to get out and exercise more.   negative...

## 2024-09-26 NOTE — ED PROVIDER NOTE - PATIENT PORTAL LINK FT
You can access the FollowMyHealth Patient Portal offered by [facility] by registering at the following website: [email or web address]. By joining [facility]’s FollowMyHealth portal, you will also be able to view your health information using other applications (apps) compatible with our system.

## 2024-09-26 NOTE — ED ADULT NURSE NOTE - SUICIDE SCREENING QUESTION 3
Patient is a 89y old  Male who presents with a chief complaint of PE, PNA? (14 Feb 2022 08:40)      INTERVAL HPI/OVERNIGHT EVENTS: Pt having severe congestion yesterday late afternoon/evening---today looks a bit better, alert on venti mask satting 100%  WBC-increased significantly to 14K  Lactic acid -increased again to 4.8    MEDICATIONS  (STANDING):  albumin human 25% IVPB 100 milliLiter(s) IV Intermittent every 6 hours  atorvastatin 20 milliGRAM(s) Oral at bedtime  bicalutamide 50 milliGRAM(s) Oral daily  cefepime   IVPB      enoxaparin Injectable 50 milliGRAM(s) SubCutaneous daily  pantoprazole    Tablet 40 milliGRAM(s) Oral before breakfast  scopolamine 1 mG/72 Hr(s) Patch 1 Patch Transdermal every 72 hours  vancomycin  IVPB        MEDICATIONS  (PRN):            Allergies    No Known Allergies    Intolerances        REVIEW OF SYSTEMS:  unable to obtain fully due to mentation but denies pain, states that is hungry     ICU Vital Signs Last 24 Hrs  T(C): 36.4 (19 Feb 2022 11:05), Max: 36.6 (18 Feb 2022 23:53)  T(F): 97.5 (19 Feb 2022 11:05), Max: 97.9 (18 Feb 2022 23:53)  HR: 62 (19 Feb 2022 11:05) (62 - 118)  BP: 91/66 (19 Feb 2022 11:05) (91/66 - 155/86)  BP(mean): --  ABP: --  ABP(mean): --  RR: 20 (19 Feb 2022 11:05) (18 - 22)  SpO2: 100% (19 Feb 2022 11:05) (94% - 100%)        PHYSICAL EXAM:  GENERAL: NAD, well-groomed, well-developed, very thin, speaking in full sentences, still on nonrebreather  HEAD:  Atraumatic, Normocephalic  EYES: EOMI, PERRLA, conjunctiva and sclera clear  ENMT: No tonsillar erythema, exudates, or enlargement; Moist mucous membranes, Good dentition, No lesions  NECK: Supple, No JVD,  NERVOUS SYSTEM:  Alert & Oriented X1,   CHEST/LUNG: diminished sounds on right lung, no wheezing, or rubs  HEART: Regular rate and rhythm; No murmurs, rubs, or gallops  ABDOMEN: Thin,, Soft, Nontender, Nondistended; Bowel sounds present  EXTREMITIES:  2+ Peripheral Pulses, No clubbing, cyanosis, or edema, right hallux and heal wtih dry eschars   SKIN: No rashes or lesions    LABS:                                                                                        12.7   14.92 )-----------( 245      ( 19 Feb 2022 07:16 )             40.8     02-19    142  |  113<H>  |  14  ----------------------------<  56<L>  4.3   |  18<L>  |  1.53<H>    Ca    8.2<L>      19 Feb 2022 10:39  Phos  4.8     02-19  Mg     2.0     02-19    TPro  6.0  /  Alb  1.5<L>  /  TBili  0.6  /  DBili  x   /  AST  92<H>  /  ALT  21  /  AlkPhos  84  02-19                               No

## 2024-09-26 NOTE — ED PROVIDER NOTE - CLINICAL SUMMARY MEDICAL DECISION MAKING FREE TEXT BOX
patient did not report any abdominal pain independently, however on examination he has tenderness in the right upper quadrant which raises the suspicion this may be biliary colic.  DKA is another possibility on the differential, however given the well-controlled sugars at home as well as the relatively controlled sugar at urgent care this is significantly lower in suspicion.  We will obtain biliary ultrasound, medicate, check labs and reevaluate. patient did not report any abdominal pain independently, however on examination he has tenderness in the right upper quadrant which raises the suspicion this may be biliary colic.  DKA is another possibility on the differential, however given the well-controlled sugars at home as well as the relatively controlled sugar at urgent care this is significantly lower in suspicion.  We will obtain biliary ultrasound, medicate, check labs and reevaluate.    Senthil Chaparro PA-C: PT with stable VS, no acute distress, non toxic appearing, tolerating PO in the ED, Pt with no acute findings no anion gap, negative acetone, PT with sig clinical improvement with meds, PT with no symptoms at this time, Pt cleared for dc home with supportive care, follow up to PCP, Pt educated about when to return to the ED if needed. PT verbalizes that he understands all instructions and results. Pt informed that ED is open and available 24/7 365 days a yr, encouraged to return to the ED if they have any change in condition, or feel the need for revaluation.

## 2025-04-01 ENCOUNTER — EMERGENCY (EMERGENCY)
Facility: HOSPITAL | Age: 25
LOS: 1 days | Discharge: HOME | End: 2025-04-01
Attending: EMERGENCY MEDICINE
Payer: COMMERCIAL

## 2025-04-01 VITALS
OXYGEN SATURATION: 98 % | TEMPERATURE: 99 F | HEART RATE: 64 BPM | SYSTOLIC BLOOD PRESSURE: 119 MMHG | RESPIRATION RATE: 18 BRPM | DIASTOLIC BLOOD PRESSURE: 78 MMHG

## 2025-04-01 VITALS
DIASTOLIC BLOOD PRESSURE: 91 MMHG | WEIGHT: 205.03 LBS | RESPIRATION RATE: 20 BRPM | HEART RATE: 74 BPM | OXYGEN SATURATION: 98 % | SYSTOLIC BLOOD PRESSURE: 134 MMHG | TEMPERATURE: 98 F | HEIGHT: 72 IN

## 2025-04-01 LAB
ALBUMIN SERPL ELPH-MCNC: 4.9 G/DL — SIGNIFICANT CHANGE UP (ref 3.3–5.2)
ALP SERPL-CCNC: 108 U/L — SIGNIFICANT CHANGE UP (ref 40–120)
ALT FLD-CCNC: 18 U/L — SIGNIFICANT CHANGE UP
ANION GAP SERPL CALC-SCNC: 12 MMOL/L — SIGNIFICANT CHANGE UP (ref 5–17)
APPEARANCE UR: CLEAR — SIGNIFICANT CHANGE UP
AST SERPL-CCNC: 19 U/L — SIGNIFICANT CHANGE UP
BASE EXCESS BLDV CALC-SCNC: 0.7 MMOL/L — SIGNIFICANT CHANGE UP (ref -2–3)
BASOPHILS # BLD AUTO: 0.03 K/UL — SIGNIFICANT CHANGE UP (ref 0–0.2)
BASOPHILS NFR BLD AUTO: 0.3 % — SIGNIFICANT CHANGE UP (ref 0–2)
BILIRUB SERPL-MCNC: 0.6 MG/DL — SIGNIFICANT CHANGE UP (ref 0.4–2)
BILIRUB UR-MCNC: NEGATIVE — SIGNIFICANT CHANGE UP
BUN SERPL-MCNC: 6.2 MG/DL — LOW (ref 8–20)
CALCIUM SERPL-MCNC: 9.4 MG/DL — SIGNIFICANT CHANGE UP (ref 8.4–10.5)
CHLORIDE SERPL-SCNC: 102 MMOL/L — SIGNIFICANT CHANGE UP (ref 96–108)
CO2 SERPL-SCNC: 24 MMOL/L — SIGNIFICANT CHANGE UP (ref 22–29)
COLOR SPEC: YELLOW — SIGNIFICANT CHANGE UP
CREAT SERPL-MCNC: 0.66 MG/DL — SIGNIFICANT CHANGE UP (ref 0.5–1.3)
DIFF PNL FLD: NEGATIVE — SIGNIFICANT CHANGE UP
EGFR: 136 ML/MIN/1.73M2 — SIGNIFICANT CHANGE UP
EGFR: 136 ML/MIN/1.73M2 — SIGNIFICANT CHANGE UP
EOSINOPHIL # BLD AUTO: 0.01 K/UL — SIGNIFICANT CHANGE UP (ref 0–0.5)
EOSINOPHIL NFR BLD AUTO: 0.1 % — SIGNIFICANT CHANGE UP (ref 0–6)
GLUCOSE SERPL-MCNC: 218 MG/DL — HIGH (ref 70–99)
GLUCOSE UR QL: 250 MG/DL
HCO3 BLDV-SCNC: 27 MMOL/L — SIGNIFICANT CHANGE UP (ref 22–29)
HCT VFR BLD CALC: 48.6 % — SIGNIFICANT CHANGE UP (ref 39–50)
HGB BLD-MCNC: 16.6 G/DL — SIGNIFICANT CHANGE UP (ref 13–17)
IMM GRANULOCYTES # BLD AUTO: 0.03 K/UL — SIGNIFICANT CHANGE UP (ref 0–0.07)
IMM GRANULOCYTES NFR BLD AUTO: 0.3 % — SIGNIFICANT CHANGE UP (ref 0–0.9)
KETONES UR-MCNC: NEGATIVE MG/DL — SIGNIFICANT CHANGE UP
LEUKOCYTE ESTERASE UR-ACNC: NEGATIVE — SIGNIFICANT CHANGE UP
LIDOCAIN IGE QN: 21 U/L — LOW (ref 22–51)
LYMPHOCYTES # BLD AUTO: 1.17 K/UL — SIGNIFICANT CHANGE UP (ref 1–3.3)
LYMPHOCYTES NFR BLD AUTO: 12 % — LOW (ref 13–44)
MCHC RBC-ENTMCNC: 29.1 PG — SIGNIFICANT CHANGE UP (ref 27–34)
MCHC RBC-ENTMCNC: 34.2 G/DL — SIGNIFICANT CHANGE UP (ref 32–36)
MCV RBC AUTO: 85.1 FL — SIGNIFICANT CHANGE UP (ref 80–100)
MONOCYTES # BLD AUTO: 0.27 K/UL — SIGNIFICANT CHANGE UP (ref 0–0.9)
MONOCYTES NFR BLD AUTO: 2.8 % — SIGNIFICANT CHANGE UP (ref 2–14)
NEUTROPHILS # BLD AUTO: 8.26 K/UL — HIGH (ref 1.8–7.4)
NEUTROPHILS NFR BLD AUTO: 84.5 % — HIGH (ref 43–77)
NITRITE UR-MCNC: NEGATIVE — SIGNIFICANT CHANGE UP
NRBC # BLD AUTO: 0 K/UL — SIGNIFICANT CHANGE UP (ref 0–0)
NRBC # FLD: 0 K/UL — SIGNIFICANT CHANGE UP (ref 0–0)
NRBC BLD AUTO-RTO: 0 /100 WBCS — SIGNIFICANT CHANGE UP (ref 0–0)
PCO2 BLDV: 52 MMHG — SIGNIFICANT CHANGE UP (ref 42–55)
PH BLDV: 7.32 — SIGNIFICANT CHANGE UP (ref 7.32–7.43)
PH UR: 7.5 — SIGNIFICANT CHANGE UP (ref 5–8)
PLATELET # BLD AUTO: 291 K/UL — SIGNIFICANT CHANGE UP (ref 150–400)
PMV BLD: 12.1 FL — SIGNIFICANT CHANGE UP (ref 7–13)
PO2 BLDV: 52 MMHG — HIGH (ref 25–45)
POTASSIUM SERPL-MCNC: 4.2 MMOL/L — SIGNIFICANT CHANGE UP (ref 3.5–5.3)
POTASSIUM SERPL-SCNC: 4.2 MMOL/L — SIGNIFICANT CHANGE UP (ref 3.5–5.3)
PROT SERPL-MCNC: 8.1 G/DL — SIGNIFICANT CHANGE UP (ref 6.6–8.7)
PROT UR-MCNC: NEGATIVE MG/DL — SIGNIFICANT CHANGE UP
RAPID RVP RESULT: SIGNIFICANT CHANGE UP
RBC # BLD: 5.71 M/UL — SIGNIFICANT CHANGE UP (ref 4.2–5.8)
RBC # FLD: 12.2 % — SIGNIFICANT CHANGE UP (ref 10.3–14.5)
SAO2 % BLDV: 82.8 % — SIGNIFICANT CHANGE UP
SARS-COV-2 RNA SPEC QL NAA+PROBE: SIGNIFICANT CHANGE UP
SODIUM SERPL-SCNC: 138 MMOL/L — SIGNIFICANT CHANGE UP (ref 135–145)
SP GR SPEC: 1.01 — SIGNIFICANT CHANGE UP (ref 1–1.03)
UROBILINOGEN FLD QL: 0.2 MG/DL — SIGNIFICANT CHANGE UP (ref 0.2–1)
WBC # BLD: 9.77 K/UL — SIGNIFICANT CHANGE UP (ref 3.8–10.5)
WBC # FLD AUTO: 9.77 K/UL — SIGNIFICANT CHANGE UP (ref 3.8–10.5)

## 2025-04-01 PROCEDURE — 74177 CT ABD & PELVIS W/CONTRAST: CPT | Mod: 26

## 2025-04-01 PROCEDURE — 82803 BLOOD GASES ANY COMBINATION: CPT

## 2025-04-01 PROCEDURE — 99284 EMERGENCY DEPT VISIT MOD MDM: CPT | Mod: 25

## 2025-04-01 PROCEDURE — 83690 ASSAY OF LIPASE: CPT

## 2025-04-01 PROCEDURE — 81003 URINALYSIS AUTO W/O SCOPE: CPT

## 2025-04-01 PROCEDURE — 85025 COMPLETE CBC W/AUTO DIFF WBC: CPT

## 2025-04-01 PROCEDURE — 99285 EMERGENCY DEPT VISIT HI MDM: CPT

## 2025-04-01 PROCEDURE — 36415 COLL VENOUS BLD VENIPUNCTURE: CPT

## 2025-04-01 PROCEDURE — 82962 GLUCOSE BLOOD TEST: CPT

## 2025-04-01 PROCEDURE — 74177 CT ABD & PELVIS W/CONTRAST: CPT | Mod: MC

## 2025-04-01 PROCEDURE — 96374 THER/PROPH/DIAG INJ IV PUSH: CPT | Mod: XU

## 2025-04-01 PROCEDURE — 0225U NFCT DS DNA&RNA 21 SARSCOV2: CPT

## 2025-04-01 PROCEDURE — 96375 TX/PRO/DX INJ NEW DRUG ADDON: CPT

## 2025-04-01 PROCEDURE — 87086 URINE CULTURE/COLONY COUNT: CPT

## 2025-04-01 PROCEDURE — 96361 HYDRATE IV INFUSION ADD-ON: CPT

## 2025-04-01 PROCEDURE — 80053 COMPREHEN METABOLIC PANEL: CPT

## 2025-04-01 RX ORDER — SODIUM CHLORIDE 9 G/1000ML
1000 INJECTION, SOLUTION INTRAVENOUS ONCE
Refills: 0 | Status: COMPLETED | OUTPATIENT
Start: 2025-04-01 | End: 2025-04-01

## 2025-04-01 RX ORDER — ONDANSETRON HCL/PF 4 MG/2 ML
4 VIAL (ML) INJECTION ONCE
Refills: 0 | Status: COMPLETED | OUTPATIENT
Start: 2025-04-01 | End: 2025-04-01

## 2025-04-01 RX ADMIN — Medication 4 MILLIGRAM(S): at 12:04

## 2025-04-01 RX ADMIN — SODIUM CHLORIDE 1000 MILLILITER(S): 9 INJECTION, SOLUTION INTRAVENOUS at 12:04

## 2025-04-01 RX ADMIN — SODIUM CHLORIDE 1000 MILLILITER(S): 9 INJECTION, SOLUTION INTRAVENOUS at 13:05

## 2025-04-01 RX ADMIN — Medication 20 MILLIGRAM(S): at 12:04

## 2025-04-01 NOTE — ED PROVIDER NOTE - ATTENDING APP SHARED VISIT CONTRIBUTION OF CARE
24 year old male PMHx DM c/o nausea/vomiting. mild distress otherwise PE unremarkable. labs and diagnostic imaging results reviewed with patient. symptoms improved with meds.

## 2025-04-01 NOTE — ED PROVIDER NOTE - NSFOLLOWUPINSTRUCTIONS_ED_ALL_ED_FT
Discharge Instructions for Nausea and Vomiting in a Diabetic Patient  Nausea and vomiting can significantly impact blood sugar control in individuals with diabetes. These instructions provide general guidance, but always follow the specific advice given by your healthcare provider. Your situation is unique, and these instructions are not a substitute for personalized medical care.    Understanding Your Situation:    Nausea and vomiting can be caused by various factors, including a viral illness, medication side effects, gastroparesis (delayed stomach emptying, a common complication of diabetes), or poor blood sugar control. Your healthcare provider has evaluated your specific situation and provided a diagnosis.    Managing Blood Sugar:    Frequent Monitoring: Check your blood sugar levels more frequently than usual, as nausea and vomiting can cause fluctuations. Follow your healthcare provider's instructions on how often to monitor.  Sick Day Rules: Follow your "sick day rules" as prescribed by your diabetes educator or doctor. This typically involves checking blood sugar more often, testing for ketones in your urine (if instructed), staying hydrated, and continuing to take your diabetes medications as prescribed (unless directed otherwise).  Contact Your Doctor: If your blood sugar levels remain consistently high or low despite following your sick day rules, or if you have moderate to large ketones in your urine, contact your healthcare provider immediately.  Adjusting Insulin (if applicable): Depending on your blood sugar levels and the reason for your nausea and vomiting, your doctor may adjust your insulin dosage. Never adjust your insulin without consulting your healthcare provider.  Managing Nausea and Vomiting:    Hydration: Sip clear liquids frequently to avoid dehydration. Examples include water, broth, sugar-free sports drinks, or ginger ale. Avoid sugary drinks as they can worsen blood sugar control.  Small, Frequent Meals: If you can tolerate food, try eating small, frequent meals of bland foods. Examples include crackers, toast, plain rice, or applesauce. Avoid fatty, greasy, or spicy foods.  Antiemetic Medications: Your doctor may prescribe antiemetic medication to help control nausea and vomiting. Take as directed.  Rest: Get plenty of rest to allow your body to recover.  Medications:    Diabetes Medications: Continue taking your regular diabetes medications unless your doctor tells you otherwise.  Other Medications: Take any other prescribed medications as directed by your doctor.  When to Seek Immediate Medical Attention:    Severe or persistent vomiting: If you cannot keep down any liquids, contact your healthcare provider.  Signs of dehydration: Symptoms include dizziness, weakness, rapid heartbeat, decreased urination, dry mouth, and sunken eyes.  High blood sugar levels: If your blood sugar levels are consistently high despite following your sick day rules.  Moderate to large ketones in your urine: If you are instructed to check for ketones and have moderate to large amounts present.  Severe abdominal pain: Contact your doctor if you experience severe abdominal pain.  Changes in mental status: Confusion, disorientation, or lethargy.  Follow-up Care:    Follow-up Appointment: Schedule and keep any follow-up appointments with your healthcare provider as directed.  Important Reminders:    Handwashing: Wash your hands frequently, especially after vomiting, to prevent the spread of illness.  Food Safety: Practice proper food safety techniques to prevent foodborne illnesses.  Follow your doctor's instructions carefully: These are general guidelines, and your doctor's specific instructions should always take precedence.  This information is for educational purposes only and should not be considered medical advice. Always consult with your doctor or other qualified healthcare provider for any questions you may have regarding a medical condition.

## 2025-04-01 NOTE — ED PROVIDER NOTE - ADDITIONAL NOTES AND INSTRUCTIONS:
PT was evaluated At [facility] ED and was found to have a condition that warranted time of to rest and heal from WORK/SCHOOL.   Senthil Chaparro PA-C

## 2025-04-01 NOTE — ED ADULT TRIAGE NOTE - CHIEF COMPLAINT QUOTE
"My blood sugars have been fluctuating to much for the past week and I have been nauseas "- Hx of DM I

## 2025-04-01 NOTE — ED PROVIDER NOTE - CLINICAL SUMMARY MEDICAL DECISION MAKING FREE TEXT BOX
PT with SPMHX of  DM  presents to the ED with complaint of N/V and fluctuating blood sugars. Pt states that he has been having symptoms for the last 5 days. Pt states that he had a gradual onset of symptoms that have been intermittent not made better or worse by anything. Pt states that he has been having both elevated sugars and mild hypoglycemia. Pt states that nothing has been making the nausea better or worse by anything. Pt admits to subjective fever, dines nasal congestion, SOB, diff breathing, weakness, back pain, change in urination, HA, dizziness. PT with SPMHX of  DM  presents to the ED with complaint of N/V and fluctuating blood sugars. Pt states that he has been having symptoms for the last 5 days. Pt states that he had a gradual onset of symptoms that have been intermittent not made better or worse by anything. Pt states that he has been having both elevated sugars and mild hypoglycemia. Pt states that nothing has been making the nausea better or worse by anything. Pt admits to subjective fever, dines nasal congestion, SOB, diff breathing, weakness, back pain, change in urination, HA, dizziness. Pt well appearing, no focal findings no, Pt with no findings on labs or imaging, Pt cleared for dc home with supportive care, close follow up to PCP, frequent finger sticks, Pt educated about when to return to the ED if needed. PT verbalizes that he understands all instructions and results. Pt informed that ED is open and available 24/7 365 days a yr, encouraged to return to the ED if they have any change in condition, or feel the need for revaluation.

## 2025-04-01 NOTE — ED PROVIDER NOTE - PATIENT PORTAL LINK FT
You can access the FollowMyHealth Patient Portal offered by [facility] by registering at the following website: [email or web address]. By joining [facility]’s FollowMyHealth portal, you will also be able to view your health information using other applications (apps) compatible with our system.

## 2025-04-01 NOTE — ED PROVIDER NOTE - OBJECTIVE STATEMENT
PT with SPMHX of  DM  presents to the ED with complaint of N/V and fluctuating blood sugars. Pt states that he has been having symptoms for the last 5 days. Pt states that he had a gradual onset of symptoms that have been intermittent not made better or worse by anything. Pt states that he has been having both elevated sugars and mild hypoglycemia. Pt states that nothing has been making the nausea better or worse by anything. Pt admits to subjective fever, dines nasal congestion, SOB, diff breathing, weakness, back pain, change in urination, HA, dizziness.

## 2025-04-02 LAB
CULTURE RESULTS: NO GROWTH — SIGNIFICANT CHANGE UP
SPECIMEN SOURCE: SIGNIFICANT CHANGE UP

## 2025-04-04 DIAGNOSIS — R11.2 NAUSEA WITH VOMITING, UNSPECIFIED: ICD-10-CM

## 2025-04-04 DIAGNOSIS — E11.9 TYPE 2 DIABETES MELLITUS WITHOUT COMPLICATIONS: ICD-10-CM

## 2025-04-04 DIAGNOSIS — R50.9 FEVER, UNSPECIFIED: ICD-10-CM
